# Patient Record
Sex: FEMALE | NOT HISPANIC OR LATINO
[De-identification: names, ages, dates, MRNs, and addresses within clinical notes are randomized per-mention and may not be internally consistent; named-entity substitution may affect disease eponyms.]

---

## 2019-12-15 ENCOUNTER — TRANSCRIPTION ENCOUNTER (OUTPATIENT)
Age: 24
End: 2019-12-15

## 2020-09-27 ENCOUNTER — TRANSCRIPTION ENCOUNTER (OUTPATIENT)
Age: 25
End: 2020-09-27

## 2020-12-17 ENCOUNTER — TRANSCRIPTION ENCOUNTER (OUTPATIENT)
Age: 25
End: 2020-12-17

## 2021-02-15 ENCOUNTER — TRANSCRIPTION ENCOUNTER (OUTPATIENT)
Age: 26
End: 2021-02-15

## 2023-12-03 ENCOUNTER — NON-APPOINTMENT (OUTPATIENT)
Age: 28
End: 2023-12-03

## 2024-01-09 ENCOUNTER — APPOINTMENT (OUTPATIENT)
Dept: OBGYN | Facility: CLINIC | Age: 29
End: 2024-01-09

## 2024-04-04 ENCOUNTER — APPOINTMENT (OUTPATIENT)
Dept: DERMATOLOGY | Facility: CLINIC | Age: 29
End: 2024-04-04

## 2024-09-30 DIAGNOSIS — F90.9 ATTENTION-DEFICIT HYPERACTIVITY DISORDER, UNSPECIFIED TYPE: ICD-10-CM

## 2024-09-30 DIAGNOSIS — K63.8219 SMALL INTESTINAL BACTERIAL OVERGROWTH, UNSPECIFIED: ICD-10-CM

## 2024-09-30 DIAGNOSIS — K90.41 NON-CELIAC GLUTEN SENSITIVITY: ICD-10-CM

## 2024-09-30 DIAGNOSIS — R14.0 ABDOMINAL DISTENSION (GASEOUS): ICD-10-CM

## 2024-09-30 DIAGNOSIS — Z86.39 PERSONAL HISTORY OF OTHER ENDOCRINE, NUTRITIONAL AND METABOLIC DISEASE: ICD-10-CM

## 2024-10-01 ENCOUNTER — APPOINTMENT (OUTPATIENT)
Dept: PULMONOLOGY | Facility: CLINIC | Age: 29
End: 2024-10-01

## 2024-10-01 VITALS — WEIGHT: 75 LBS | HEIGHT: 60 IN | BODY MASS INDEX: 14.72 KG/M2

## 2024-10-01 DIAGNOSIS — R19.5 OTHER FECAL ABNORMALITIES: ICD-10-CM

## 2024-10-01 DIAGNOSIS — J32.9 CHRONIC SINUSITIS, UNSPECIFIED: ICD-10-CM

## 2024-10-01 DIAGNOSIS — R63.6 UNDERWEIGHT: ICD-10-CM

## 2024-10-01 DIAGNOSIS — K86.81 EXOCRINE PANCREATIC INSUFFICIENCY: ICD-10-CM

## 2024-10-01 DIAGNOSIS — J45.20 MILD INTERMITTENT ASTHMA, UNCOMPLICATED: ICD-10-CM

## 2024-10-01 DIAGNOSIS — Z80.3 FAMILY HISTORY OF MALIGNANT NEOPLASM OF BREAST: ICD-10-CM

## 2024-10-01 DIAGNOSIS — J31.0 CHRONIC RHINITIS: ICD-10-CM

## 2024-10-01 DIAGNOSIS — D89.40 MAST CELL ACTIVATION, UNSPECIFIED: ICD-10-CM

## 2024-10-01 DIAGNOSIS — Z81.8 FAMILY HISTORY OF OTHER MENTAL AND BEHAVIORAL DISORDERS: ICD-10-CM

## 2024-10-01 DIAGNOSIS — R05.3 CHRONIC COUGH: ICD-10-CM

## 2024-10-01 DIAGNOSIS — K59.09 OTHER CONSTIPATION: ICD-10-CM

## 2024-10-01 PROCEDURE — 99205 OFFICE O/P NEW HI 60 MIN: CPT

## 2024-10-01 RX ORDER — CHLORHEXIDINE GLUCONATE 4 %
LIQUID (ML) TOPICAL DAILY
Refills: 0 | Status: ACTIVE | COMMUNITY

## 2024-10-01 RX ORDER — PANCRELIPASE 36000; 180000; 114000 [USP'U]/1; [USP'U]/1; [USP'U]/1
36000-114000 CAPSULE, DELAYED RELEASE PELLETS ORAL
Refills: 0 | Status: ACTIVE | COMMUNITY
Start: 2024-09-30

## 2024-10-01 RX ORDER — ALBUTEROL SULFATE 90 UG/1
108 (90 BASE) INHALANT RESPIRATORY (INHALATION)
Qty: 1 | Refills: 0 | Status: ACTIVE | COMMUNITY
Start: 2024-10-01

## 2024-10-01 RX ORDER — PRUCALOPRIDE 2 MG/1
2 TABLET, FILM COATED ORAL
Qty: 30 | Refills: 0 | Status: DISCONTINUED | COMMUNITY
End: 2024-10-01

## 2024-10-01 RX ORDER — LISDEXAMFETAMINE DIMESYLATE 10 MG/1
10 CAPSULE ORAL
Refills: 0 | Status: ACTIVE | COMMUNITY
Start: 2024-09-30

## 2024-10-01 NOTE — ASSESSMENT
[FreeTextEntry1] : 29-year-old female with chronic GI issues, sinonasal complaints and asthma referred for CF evaluation in setting of recently identified pancreatic insufficiency.  Possibility but low likelihood of atypical CF. Genetic counseling, see separate note Sweat Test today at [facility]. Order placed in [employer] for lab. Given description and explanation of utility of Sweat test as gold standard for diagnosing CF. Expect results same day versus am tomorrow.  Expects a call from Genetic Counselor to discuss results and next steps. We appreciate the opportunity to participate in the care of Ms. Solomon and will provide consult and records of testing to referring provider.

## 2024-10-01 NOTE — FAMILY HISTORY
[TextEntry] : No consanguinity Polish/Tenriism ancestry. No documented CF or Carriers in family, developmentally disabled paternal cousin unclear if possible CF vs CP.

## 2024-10-01 NOTE — ASSESSMENT
[FreeTextEntry1] : 29-year-old female with chronic GI issues, sinonasal complaints and asthma referred for CF evaluation in setting of recently identified pancreatic insufficiency.  Possibility but low likelihood of atypical CF. Genetic counseling, see separate note Sweat Test today at [facility]. Order placed in [employer] for lab. Given description and explanation of utility of Sweat test as gold standard for diagnosing CF. Expect results same day versus am tomorrow.  Expects a call from Genetic Counselor to discuss results and next steps. We appreciate the opportunity to participate in the care of Ms. Solomon and will provide consult and records of testing to referring provider.

## 2024-10-01 NOTE — FAMILY HISTORY
[TextEntry] : No consanguinity Polish/Anabaptist ancestry. No documented CF or Carriers in family, developmentally disabled paternal cousin unclear if possible CF vs CP.

## 2024-10-01 NOTE — REASON FOR VISIT
[Home] : at home, [unfilled] , at the time of the visit. [Medical Office: ([facility])___] : at the medical office located in  [Patient] : the patient [Self] : self [Consultation] : a consultation [Asthma] : asthma [Cough] : cough [TextBox_44] : Pancreatic insufficiency, evaluation for Cystic Fibrosis [TextBox_13] : Jeanette Wade MD- Gastroenterology

## 2024-10-01 NOTE — REASON FOR VISIT
[Home] : at home, [unfilled] , at the time of the visit. [Medical Office: ([facility])___] : at the medical office located in  [Patient] : the patient [Self] : self [Consultation] : a consultation [Asthma] : asthma [Cough] : cough [TextBox_44] : Pancreatic insufficiency, evaluation for Cystic Fibrosis [TextBox_13] : Jeanette Wade MD- Gastroenterology

## 2024-10-01 NOTE — REVIEW OF SYSTEMS
[Cough] : cough [Abdominal Pain] : abdominal pain [Constipation] : constipation [Food Intolerance] : food intolerance [Negative] : Hematologic [Fever] : no fever [Fatigue] : no fatigue [Recent Wt Gain (___ Lbs)] : ~T no recent weight gain [Chills] : no chills [Poor Appetite] : no poor appetite [Recent Wt Loss (___ Lbs)] : ~T no recent weight loss [Ear Disturbance] : no ear disturbance [Epistaxis] : no epistaxis [Sore Throat] : no sore throat [Nasal Congestion] : no nasal congestion [Postnasal Drip] : no postnasal drip [Dry Mouth] : no dry mouth [Mouth Ulcers] : no mouth ulcers [Poor Dentition] : no poor dentition [Hemoptysis] : no hemoptysis [Chest Tightness] : no chest tightness [Sputum] : no sputum [Dyspnea] : no dyspnea [Pleuritic Pain] : no pleuritic pain [A.M. Dry Mouth] : no a.m. dry mouth [SOB on Exertion] : no sob on exertion [Chest Discomfort] : no chest discomfort [Claudication] : no claudication [Edema] : no edema [Leg Cramps] : no leg cramps [Orthopnea] : no orthopnea [Palpitations] : no palpitations [Phlebitis] : no phlebitis [PND] : no PND [Syncope] : no syncope [Hay Fever] : no hay fever [Watery Eyes] : no watery eyes [Seasonal Allergies] : no seasonal allergies [Nasal Discharge] : no nasal discharge [Immunocompromised] : not immunocompromised [GERD] : no gerd [Nausea] : no nausea [Vomiting] : no vomiting [Diarrhea] : no diarrhea [Dysphagia] : no dysphagia [Bleeding] : no bleeding [Hepatic Disease] : no hepatic disease [Rash] : no rash [Ulcerations] : no ulcerations [Itch] : no itch [Headache] : no headache [Focal Weakness] : no focal weakness [Dizziness] : no dizziness [Numbness] : no numbness [Memory Loss] : no memory loss [Involuntary Movements] : no involuntary movements [Paralysis] : no paralysis [Confusion] : no confusion [Tremor] : no tremor [Depression] : no depression [Anxiety] : no anxiety [Panic Attacks] : no panic attacks [Diabetes] : no diabetes [Thyroid Problem] : no thyroid problem [Obesity] : no obesity [TextBox_30] : difficult to take a deep breath [TextBox_104] : flush/rash with alcohol. morningtime nasal facial flush on occasion [TextBox_137] : known ADHD on medication

## 2024-10-01 NOTE — HISTORY OF PRESENT ILLNESS
[Never] : never [TextBox_4] : Patient is a 29-year-old female with history of pancreatic insufficiency, chronic GI complaints, mast cell activation syndrome, asthma, SIBO, and ADHD referred by GI Dr fish Wade for CF evaluation.  Longstanding GI issues including loose but infrequent stools, bloating, gluten intolerance. Found to have a low fecal elastase suggestive of Exocrine pancreatic insufficiency.  Pulmonary history includes Asthma and mast cell activation syndrome. Frequent Sinus issues fullness/pressure No nasal polyps + history of ear infections and otitis externa +  Nutritional issues always on low growth curve. No signs or symptoms of malabsorption. Unclear if any Pancreatitis- never hospitalized for but chronic abdominal complaints No Liver/Biliary issues No issues with infertility

## 2024-10-01 NOTE — PHYSICAL EXAM
[No Acute Distress] : no acute distress [Well Groomed] : well groomed [Normal Appearance] : normal appearance [No Resp Distress] : no resp distress [No Abnormalities] : no abnormalities [Normal Gait] : normal gait [FROM] : FROM [Normal Color/ Pigmentation] : normal color/ pigmentation [No Focal Deficits] : no focal deficits [Oriented x3] : oriented x3 [Normal Affect] : normal affect [Over the Past 2 Weeks, Have You Felt Down, Depressed, or Hopeless?] : 1.) Over the past 2 weeks, have you felt down, depressed, or hopeless? No [Over the Past 2 Weeks, Have You Felt Little Interest or Pleasure Doing Things?] : 2.) Over the past 2 weeks, have you felt little interest or pleasure doing things? No [TextBox_2] : Thin, appears younger than stated age [TextBox_11] : Not assessed [TextBox_54] : Not assessed [TextBox_68] : Not assessed [TextBox_89] : Not assessed

## 2024-10-02 LAB
CHLORIDE, SWEAT 1: 13 MMOL/L
CHLORIDE, SWEAT 2: 16 MMOL/L
SWEAT SOURCE 1: NORMAL
SWEAT SOURCE 2: NORMAL

## 2025-05-11 ENCOUNTER — NON-APPOINTMENT (OUTPATIENT)
Age: 30
End: 2025-05-11

## 2025-06-11 ENCOUNTER — NON-APPOINTMENT (OUTPATIENT)
Age: 30
End: 2025-06-11

## 2025-08-28 ENCOUNTER — APPOINTMENT (OUTPATIENT)
Dept: RADIOLOGY | Facility: CLINIC | Age: 30
End: 2025-08-28

## 2025-08-28 ENCOUNTER — TRANSCRIPTION ENCOUNTER (OUTPATIENT)
Age: 30
End: 2025-08-28